# Patient Record
Sex: MALE | Race: WHITE
[De-identification: names, ages, dates, MRNs, and addresses within clinical notes are randomized per-mention and may not be internally consistent; named-entity substitution may affect disease eponyms.]

---

## 2021-12-10 LAB
BUN BLD-MCNC: 20 MG/DL (ref 7–18)
GLUCOSE SERPLBLD-MCNC: 100 MG/DL (ref 74–106)
HCT VFR BLD CALC: 40.7 % (ref 39.6–49)
INR BLD: 1.21
LYMPHOCYTES # SPEC AUTO: 2.1 K/UL (ref 0.7–4.9)
PMV BLD: 7.7 FL (ref 7.6–11.3)
POTASSIUM SERPL-SCNC: 4.1 MMOL/L (ref 3.5–5.1)
RBC # BLD: 4.55 M/UL (ref 4.33–5.43)

## 2021-12-13 ENCOUNTER — HOSPITAL ENCOUNTER (OUTPATIENT)
Dept: HOSPITAL 97 - CCL | Age: 71
Discharge: HOME | End: 2021-12-13
Attending: INTERNAL MEDICINE
Payer: COMMERCIAL

## 2021-12-13 VITALS — DIASTOLIC BLOOD PRESSURE: 75 MMHG | OXYGEN SATURATION: 99 % | SYSTOLIC BLOOD PRESSURE: 107 MMHG

## 2021-12-13 VITALS — TEMPERATURE: 97.9 F

## 2021-12-13 DIAGNOSIS — I48.91: Primary | ICD-10-CM

## 2021-12-13 DIAGNOSIS — Z20.822: ICD-10-CM

## 2021-12-13 DIAGNOSIS — I10: ICD-10-CM

## 2021-12-13 DIAGNOSIS — Z82.49: ICD-10-CM

## 2021-12-13 PROCEDURE — 93005 ELECTROCARDIOGRAM TRACING: CPT

## 2021-12-13 PROCEDURE — 92960 CARDIOVERSION ELECTRIC EXT: CPT

## 2021-12-13 PROCEDURE — 85610 PROTHROMBIN TIME: CPT

## 2021-12-13 PROCEDURE — 85730 THROMBOPLASTIN TIME PARTIAL: CPT

## 2021-12-13 PROCEDURE — 80048 BASIC METABOLIC PNL TOTAL CA: CPT

## 2021-12-13 PROCEDURE — 85025 COMPLETE CBC W/AUTO DIFF WBC: CPT

## 2021-12-13 PROCEDURE — 36415 COLL VENOUS BLD VENIPUNCTURE: CPT

## 2021-12-13 NOTE — OP
Surgeon:  Keo Nieves MD



Assistant:  Laurita East.



Admitted on 12/13/2021 to my service as an outpatient.



Reason For Admission:  Direct current cardioversion.



Indication:  Atrial fibrillation.



History Of Present Illness:  The patient is 71.  Negative cardiac workup.  Atrial fibrillation, has f
larry metoprolol.  He is on Xarelto for at least 3 weeks, brought to the recovery room today given Ve
rsed for sedation.  He was given a total of 10 mg of Versed, received 1 shock with 100 joules, and he
 converted to sinus rhythm without any complication or blood loss.



Anesthesia:  Total conscious sedation was 20 minutes.



Final Diagnosis:  Atrial fibrillation, status post successful cardioversion to sinus rhythm.  He will
 continue metoprolol and Xarelto.  He will go home when he wakes up.  He will see me in the office in
 2 weeks.





NB/HERO

DD:  12/13/2021 08:23:21Voice ID:  [id number]

DT:  12/13/2021 09:38:11Report ID:  [id number]

## 2022-01-14 LAB
BUN BLD-MCNC: 17 MG/DL (ref 7–18)
GLUCOSE SERPLBLD-MCNC: 104 MG/DL (ref 74–106)
HCT VFR BLD CALC: 42.3 % (ref 39.6–49)
INR BLD: 2.07
LYMPHOCYTES # SPEC AUTO: 1.5 K/UL (ref 0.7–4.9)
PMV BLD: 7.3 FL (ref 7.6–11.3)
POTASSIUM SERPL-SCNC: 4.4 MMOL/L (ref 3.5–5.1)
RBC # BLD: 4.77 M/UL (ref 4.33–5.43)

## 2022-01-17 ENCOUNTER — HOSPITAL ENCOUNTER (OUTPATIENT)
Dept: HOSPITAL 97 - CCL | Age: 72
Discharge: HOME | End: 2022-01-17
Attending: INTERNAL MEDICINE
Payer: COMMERCIAL

## 2022-01-17 VITALS — SYSTOLIC BLOOD PRESSURE: 116 MMHG | DIASTOLIC BLOOD PRESSURE: 87 MMHG

## 2022-01-17 VITALS — OXYGEN SATURATION: 96 %

## 2022-01-17 VITALS — TEMPERATURE: 97.8 F

## 2022-01-17 DIAGNOSIS — I48.0: Primary | ICD-10-CM

## 2022-01-17 DIAGNOSIS — Z20.822: ICD-10-CM

## 2022-01-17 DIAGNOSIS — Z82.49: ICD-10-CM

## 2022-01-17 DIAGNOSIS — I10: ICD-10-CM

## 2022-01-17 PROCEDURE — 93005 ELECTROCARDIOGRAM TRACING: CPT

## 2022-01-17 PROCEDURE — 85610 PROTHROMBIN TIME: CPT

## 2022-01-17 PROCEDURE — 85025 COMPLETE CBC W/AUTO DIFF WBC: CPT

## 2022-01-17 PROCEDURE — 36415 COLL VENOUS BLD VENIPUNCTURE: CPT

## 2022-01-17 PROCEDURE — 85730 THROMBOPLASTIN TIME PARTIAL: CPT

## 2022-01-17 PROCEDURE — 92960 CARDIOVERSION ELECTRIC EXT: CPT

## 2022-01-17 PROCEDURE — 80048 BASIC METABOLIC PNL TOTAL CA: CPT

## 2022-01-17 NOTE — OP
Surgeon:  Keo Nieves MD



Procedure:  Direct current cardioversion.



Indication:  Atrial fibrillation.  Mr. Freeman is 71, had a cardioversion about a month ago, on meto
prolol and Xarelto, which was successful, but he went back into AFib.  We will put him on amiodarone 
400 mg b.i.d. load for a week, then 200 mg daily.  He remained in atrial fibrillation with rapid vent
ricular response, symptomatic.



Procedure In Detail:  Brought to the cath lab today.  He received 7 mg Versed IV push for total sedat
ion.  Received 1 shock of 100 joules, which did not work and another shock of 200 joules and converte
d to sinus rhythm.  There were no complications.  No blood loss.



Postoperative Diagnosis:  Atrial fibrillation, status post successful direct current cardioversion.



Anesthesia:  Total conscious sedation was 30 minutes. 



Plan is to discharge him home when he wakes up.  Continue amiodarone and Xarelto.  If his symptoms re
occur, we will send him for an ablation.





NB/HERO

DD:  01/17/2022 07:36:46Voice ID:  [id number]

DT:  01/17/2022 08:09:42Report ID:  [id number]

## 2022-01-19 NOTE — EKG
Test Date:    2022-01-17               Test Time:    07:41:47

Technician:   ОЛЬГАOT                                   

                                                     

MEASUREMENT RESULTS:                                       

Intervals:                                           

Rate:         79                                     

WY:           190                                    

QRSD:         88                                     

QT:           414                                    

QTc:          474                                    

Axis:                                                

P:            24                                     

WY:           190                                    

QRS:          67                                     

T:            40                                     

                                                     

INTERPRETIVE STATEMENTS:                                       

                                                     

Normal sinus rhythm

Septal infarct, age undetermined

Abnormal ECG

Compared to ECG 12/13/2021 09:54:51

Myocardial infarct finding now present

Right-axis deviation no longer present



Electronically Signed On 01-19-22 07:26:38 CST by Keo Nieves